# Patient Record
Sex: FEMALE | Race: WHITE | NOT HISPANIC OR LATINO
[De-identification: names, ages, dates, MRNs, and addresses within clinical notes are randomized per-mention and may not be internally consistent; named-entity substitution may affect disease eponyms.]

---

## 2017-01-09 ENCOUNTER — APPOINTMENT (OUTPATIENT)
Dept: OBGYN | Facility: CLINIC | Age: 22
End: 2017-01-09

## 2017-01-09 VITALS
SYSTOLIC BLOOD PRESSURE: 120 MMHG | BODY MASS INDEX: 27.49 KG/M2 | DIASTOLIC BLOOD PRESSURE: 80 MMHG | HEIGHT: 63 IN | WEIGHT: 155.13 LBS

## 2017-01-09 RX ORDER — ZOLPIDEM TARTRATE 10 MG/1
10 TABLET ORAL
Qty: 12 | Refills: 0 | Status: ACTIVE | COMMUNITY
Start: 2017-01-09 | End: 1900-01-01

## 2017-01-11 LAB
GP B STREP DNA SPEC QL NAA+PROBE: NORMAL
GP B STREP DNA SPEC QL NAA+PROBE: NOT DETECTED
SOURCE GBS: NORMAL

## 2017-01-18 ENCOUNTER — APPOINTMENT (OUTPATIENT)
Dept: OBGYN | Facility: CLINIC | Age: 22
End: 2017-01-18

## 2017-01-18 VITALS
HEIGHT: 63 IN | BODY MASS INDEX: 27.82 KG/M2 | DIASTOLIC BLOOD PRESSURE: 60 MMHG | SYSTOLIC BLOOD PRESSURE: 100 MMHG | WEIGHT: 157 LBS

## 2017-01-25 ENCOUNTER — APPOINTMENT (OUTPATIENT)
Dept: OBGYN | Facility: CLINIC | Age: 22
End: 2017-01-25

## 2017-01-25 VITALS
SYSTOLIC BLOOD PRESSURE: 104 MMHG | DIASTOLIC BLOOD PRESSURE: 70 MMHG | BODY MASS INDEX: 27.82 KG/M2 | WEIGHT: 157 LBS | HEIGHT: 63 IN

## 2017-01-31 ENCOUNTER — APPOINTMENT (OUTPATIENT)
Dept: OBGYN | Facility: CLINIC | Age: 22
End: 2017-01-31

## 2017-01-31 VITALS — DIASTOLIC BLOOD PRESSURE: 70 MMHG | SYSTOLIC BLOOD PRESSURE: 120 MMHG | WEIGHT: 158.13 LBS

## 2017-02-04 ENCOUNTER — OUTPATIENT (OUTPATIENT)
Dept: OUTPATIENT SERVICES | Facility: HOSPITAL | Age: 22
LOS: 1 days | End: 2017-02-04
Payer: COMMERCIAL

## 2017-02-04 DIAGNOSIS — Z3A.00 WEEKS OF GESTATION OF PREGNANCY NOT SPECIFIED: ICD-10-CM

## 2017-02-04 DIAGNOSIS — O26.899 OTHER SPECIFIED PREGNANCY RELATED CONDITIONS, UNSPECIFIED TRIMESTER: ICD-10-CM

## 2017-02-04 PROCEDURE — 99214 OFFICE O/P EST MOD 30 MIN: CPT

## 2017-02-08 ENCOUNTER — APPOINTMENT (OUTPATIENT)
Dept: OBGYN | Facility: CLINIC | Age: 22
End: 2017-02-08

## 2017-02-08 VITALS
BODY MASS INDEX: 28.42 KG/M2 | DIASTOLIC BLOOD PRESSURE: 80 MMHG | WEIGHT: 160.38 LBS | HEIGHT: 63 IN | SYSTOLIC BLOOD PRESSURE: 120 MMHG

## 2017-02-13 ENCOUNTER — APPOINTMENT (OUTPATIENT)
Dept: OBGYN | Facility: CLINIC | Age: 22
End: 2017-02-13

## 2017-02-13 VITALS
DIASTOLIC BLOOD PRESSURE: 70 MMHG | HEIGHT: 63 IN | WEIGHT: 160.25 LBS | SYSTOLIC BLOOD PRESSURE: 120 MMHG | BODY MASS INDEX: 28.39 KG/M2

## 2017-02-15 ENCOUNTER — APPOINTMENT (OUTPATIENT)
Dept: OBGYN | Facility: CLINIC | Age: 22
End: 2017-02-15

## 2017-02-15 ENCOUNTER — INPATIENT (INPATIENT)
Facility: HOSPITAL | Age: 22
LOS: 2 days | Discharge: HOME | End: 2017-02-18
Attending: OBSTETRICS & GYNECOLOGY | Admitting: OBSTETRICS & GYNECOLOGY
Payer: COMMERCIAL

## 2017-02-15 VITALS
BODY MASS INDEX: 28.39 KG/M2 | WEIGHT: 160.25 LBS | HEIGHT: 63 IN | DIASTOLIC BLOOD PRESSURE: 80 MMHG | SYSTOLIC BLOOD PRESSURE: 120 MMHG

## 2017-02-15 VITALS — WEIGHT: 160.06 LBS | HEIGHT: 63 IN

## 2017-02-15 LAB
BASOPHILS NFR BLD AUTO: 0.3 % — SIGNIFICANT CHANGE UP (ref 0–2)
EOSINOPHIL NFR BLD AUTO: 0.3 % — SIGNIFICANT CHANGE UP (ref 0–6)
HCT VFR BLD CALC: 35 % — SIGNIFICANT CHANGE UP (ref 34.5–45)
HGB BLD-MCNC: 12 G/DL — SIGNIFICANT CHANGE UP (ref 11.5–15.5)
LYMPHOCYTES # BLD AUTO: 16.9 % — SIGNIFICANT CHANGE UP (ref 13–44)
MCHC RBC-ENTMCNC: 30 PG — SIGNIFICANT CHANGE UP (ref 27–34)
MCHC RBC-ENTMCNC: 34.3 G/DL — SIGNIFICANT CHANGE UP (ref 32–36)
MCV RBC AUTO: 87.5 FL — SIGNIFICANT CHANGE UP (ref 80–100)
MONOCYTES NFR BLD AUTO: 8 % — SIGNIFICANT CHANGE UP (ref 2–14)
NEUTROPHILS NFR BLD AUTO: 74.5 % — SIGNIFICANT CHANGE UP (ref 43–77)
PLATELET # BLD AUTO: 304 K/UL — SIGNIFICANT CHANGE UP (ref 150–400)
RBC # BLD: 4 M/UL — SIGNIFICANT CHANGE UP (ref 3.8–5.2)
RBC # FLD: 11.7 % — SIGNIFICANT CHANGE UP (ref 10.3–16.9)
WBC # BLD: 11.9 K/UL — HIGH (ref 3.8–10.5)
WBC # FLD AUTO: 11.9 K/UL — HIGH (ref 3.8–10.5)

## 2017-02-15 RX ORDER — OXYTOCIN 10 UNIT/ML
333.33 VIAL (ML) INJECTION
Qty: 20 | Refills: 0 | Status: DISCONTINUED | OUTPATIENT
Start: 2017-02-15 | End: 2017-02-16

## 2017-02-15 RX ORDER — CITRIC ACID/SODIUM CITRATE 300-500 MG
15 SOLUTION, ORAL ORAL EVERY 4 HOURS
Qty: 0 | Refills: 0 | Status: DISCONTINUED | OUTPATIENT
Start: 2017-02-15 | End: 2017-02-16

## 2017-02-15 RX ORDER — SODIUM CHLORIDE 9 MG/ML
1000 INJECTION, SOLUTION INTRAVENOUS
Qty: 0 | Refills: 0 | Status: DISCONTINUED | OUTPATIENT
Start: 2017-02-15 | End: 2017-02-16

## 2017-02-15 RX ORDER — SODIUM CHLORIDE 9 MG/ML
1000 INJECTION, SOLUTION INTRAVENOUS ONCE
Qty: 0 | Refills: 0 | Status: COMPLETED | OUTPATIENT
Start: 2017-02-15 | End: 2017-02-15

## 2017-02-15 RX ADMIN — SODIUM CHLORIDE 2000 MILLILITER(S): 9 INJECTION, SOLUTION INTRAVENOUS at 22:59

## 2017-02-16 LAB
BLD GP AB SCN SERPL QL: NEGATIVE — SIGNIFICANT CHANGE UP
BLD GP AB SCN SERPL QL: NEGATIVE — SIGNIFICANT CHANGE UP
RH IG SCN BLD-IMP: POSITIVE — SIGNIFICANT CHANGE UP
RH IG SCN BLD-IMP: POSITIVE — SIGNIFICANT CHANGE UP
T PALLIDUM AB TITR SER: NEGATIVE — SIGNIFICANT CHANGE UP

## 2017-02-16 PROCEDURE — 59400 OBSTETRICAL CARE: CPT

## 2017-02-16 RX ORDER — HYDROCORTISONE 1 %
1 OINTMENT (GRAM) TOPICAL EVERY 4 HOURS
Qty: 0 | Refills: 0 | Status: DISCONTINUED | OUTPATIENT
Start: 2017-02-16 | End: 2017-02-18

## 2017-02-16 RX ORDER — SIMETHICONE 80 MG/1
80 TABLET, CHEWABLE ORAL EVERY 6 HOURS
Qty: 0 | Refills: 0 | Status: DISCONTINUED | OUTPATIENT
Start: 2017-02-16 | End: 2017-02-18

## 2017-02-16 RX ORDER — LANOLIN
1 OINTMENT (GRAM) TOPICAL EVERY 6 HOURS
Qty: 0 | Refills: 0 | Status: DISCONTINUED | OUTPATIENT
Start: 2017-02-16 | End: 2017-02-18

## 2017-02-16 RX ORDER — TETANUS TOXOID, REDUCED DIPHTHERIA TOXOID AND ACELLULAR PERTUSSIS VACCINE, ADSORBED 5; 2.5; 8; 8; 2.5 [IU]/.5ML; [IU]/.5ML; UG/.5ML; UG/.5ML; UG/.5ML
0.5 SUSPENSION INTRAMUSCULAR ONCE
Qty: 0 | Refills: 0 | Status: DISCONTINUED | OUTPATIENT
Start: 2017-02-16 | End: 2017-02-18

## 2017-02-16 RX ORDER — PRAMOXINE HYDROCHLORIDE 150 MG/15G
1 AEROSOL, FOAM RECTAL EVERY 4 HOURS
Qty: 0 | Refills: 0 | Status: DISCONTINUED | OUTPATIENT
Start: 2017-02-16 | End: 2017-02-18

## 2017-02-16 RX ORDER — AER TRAVELER 0.5 G/1
1 SOLUTION RECTAL; TOPICAL EVERY 4 HOURS
Qty: 0 | Refills: 0 | Status: DISCONTINUED | OUTPATIENT
Start: 2017-02-16 | End: 2017-02-18

## 2017-02-16 RX ORDER — ACETAMINOPHEN 500 MG
650 TABLET ORAL EVERY 6 HOURS
Qty: 0 | Refills: 0 | Status: DISCONTINUED | OUTPATIENT
Start: 2017-02-16 | End: 2017-02-18

## 2017-02-16 RX ORDER — INFLUENZA VIRUS VACCINE 15; 15; 15; 15 UG/.5ML; UG/.5ML; UG/.5ML; UG/.5ML
0.5 SUSPENSION INTRAMUSCULAR ONCE
Qty: 0 | Refills: 0 | Status: DISCONTINUED | OUTPATIENT
Start: 2017-02-16 | End: 2017-02-16

## 2017-02-16 RX ORDER — DOCUSATE SODIUM 100 MG
100 CAPSULE ORAL
Qty: 0 | Refills: 0 | Status: DISCONTINUED | OUTPATIENT
Start: 2017-02-16 | End: 2017-02-18

## 2017-02-16 RX ORDER — SODIUM CHLORIDE 9 MG/ML
3 INJECTION INTRAMUSCULAR; INTRAVENOUS; SUBCUTANEOUS EVERY 8 HOURS
Qty: 0 | Refills: 0 | Status: DISCONTINUED | OUTPATIENT
Start: 2017-02-16 | End: 2017-02-18

## 2017-02-16 RX ORDER — SODIUM CHLORIDE 9 MG/ML
500 INJECTION, SOLUTION INTRAVENOUS ONCE
Qty: 0 | Refills: 0 | Status: COMPLETED | OUTPATIENT
Start: 2017-02-16 | End: 2017-02-16

## 2017-02-16 RX ORDER — GLYCERIN ADULT
1 SUPPOSITORY, RECTAL RECTAL AT BEDTIME
Qty: 0 | Refills: 0 | Status: DISCONTINUED | OUTPATIENT
Start: 2017-02-16 | End: 2017-02-18

## 2017-02-16 RX ORDER — IBUPROFEN 200 MG
600 TABLET ORAL EVERY 6 HOURS
Qty: 0 | Refills: 0 | Status: DISCONTINUED | OUTPATIENT
Start: 2017-02-16 | End: 2017-02-18

## 2017-02-16 RX ORDER — DIPHENHYDRAMINE HCL 50 MG
25 CAPSULE ORAL EVERY 6 HOURS
Qty: 0 | Refills: 0 | Status: DISCONTINUED | OUTPATIENT
Start: 2017-02-16 | End: 2017-02-18

## 2017-02-16 RX ORDER — BENZOCAINE 10 %
1 GEL (GRAM) MUCOUS MEMBRANE EVERY 6 HOURS
Qty: 0 | Refills: 0 | Status: DISCONTINUED | OUTPATIENT
Start: 2017-02-16 | End: 2017-02-18

## 2017-02-16 RX ORDER — OXYTOCIN 10 UNIT/ML
41.67 VIAL (ML) INJECTION
Qty: 20 | Refills: 0 | Status: DISCONTINUED | OUTPATIENT
Start: 2017-02-16 | End: 2017-02-18

## 2017-02-16 RX ORDER — DIBUCAINE 1 %
1 OINTMENT (GRAM) RECTAL EVERY 4 HOURS
Qty: 0 | Refills: 0 | Status: DISCONTINUED | OUTPATIENT
Start: 2017-02-16 | End: 2017-02-18

## 2017-02-16 RX ORDER — MAGNESIUM HYDROXIDE 400 MG/1
30 TABLET, CHEWABLE ORAL
Qty: 0 | Refills: 0 | Status: DISCONTINUED | OUTPATIENT
Start: 2017-02-16 | End: 2017-02-18

## 2017-02-16 RX ADMIN — Medication 650 MILLIGRAM(S): at 16:40

## 2017-02-16 RX ADMIN — Medication 600 MILLIGRAM(S): at 12:30

## 2017-02-16 RX ADMIN — Medication 600 MILLIGRAM(S): at 11:19

## 2017-02-16 RX ADMIN — SODIUM CHLORIDE 3 MILLILITER(S): 9 INJECTION INTRAMUSCULAR; INTRAVENOUS; SUBCUTANEOUS at 22:27

## 2017-02-16 RX ADMIN — Medication 650 MILLIGRAM(S): at 22:20

## 2017-02-16 RX ADMIN — Medication 650 MILLIGRAM(S): at 21:39

## 2017-02-16 RX ADMIN — Medication 125 MILLIUNIT(S)/MIN: at 09:45

## 2017-02-16 RX ADMIN — SODIUM CHLORIDE 3 MILLILITER(S): 9 INJECTION INTRAMUSCULAR; INTRAVENOUS; SUBCUTANEOUS at 14:10

## 2017-02-16 RX ADMIN — SODIUM CHLORIDE 1500 MILLILITER(S): 9 INJECTION, SOLUTION INTRAVENOUS at 01:54

## 2017-02-16 RX ADMIN — Medication 650 MILLIGRAM(S): at 15:42

## 2017-02-17 RX ADMIN — Medication 1 APPLICATION(S): at 09:03

## 2017-02-17 RX ADMIN — Medication 1 SPRAY(S): at 12:13

## 2017-02-17 RX ADMIN — AER TRAVELER 1 APPLICATION(S): 0.5 SOLUTION RECTAL; TOPICAL at 12:14

## 2017-02-17 RX ADMIN — SODIUM CHLORIDE 3 MILLILITER(S): 9 INJECTION INTRAMUSCULAR; INTRAVENOUS; SUBCUTANEOUS at 06:28

## 2017-02-17 RX ADMIN — Medication 100 MILLIGRAM(S): at 12:14

## 2017-02-17 RX ADMIN — SODIUM CHLORIDE 3 MILLILITER(S): 9 INJECTION INTRAMUSCULAR; INTRAVENOUS; SUBCUTANEOUS at 22:04

## 2017-02-17 RX ADMIN — Medication 600 MILLIGRAM(S): at 19:30

## 2017-02-17 RX ADMIN — Medication 600 MILLIGRAM(S): at 13:00

## 2017-02-17 RX ADMIN — Medication 600 MILLIGRAM(S): at 18:40

## 2017-02-17 RX ADMIN — Medication 600 MILLIGRAM(S): at 04:58

## 2017-02-17 RX ADMIN — Medication 600 MILLIGRAM(S): at 04:19

## 2017-02-17 RX ADMIN — Medication 600 MILLIGRAM(S): at 12:14

## 2017-02-18 ENCOUNTER — TRANSCRIPTION ENCOUNTER (OUTPATIENT)
Age: 22
End: 2017-02-18

## 2017-02-18 VITALS
SYSTOLIC BLOOD PRESSURE: 100 MMHG | OXYGEN SATURATION: 100 % | TEMPERATURE: 98 F | RESPIRATION RATE: 18 BRPM | HEART RATE: 72 BPM | DIASTOLIC BLOOD PRESSURE: 54 MMHG

## 2017-02-18 PROCEDURE — 86900 BLOOD TYPING SEROLOGIC ABO: CPT

## 2017-02-18 PROCEDURE — 86901 BLOOD TYPING SEROLOGIC RH(D): CPT

## 2017-02-18 PROCEDURE — 99214 OFFICE O/P EST MOD 30 MIN: CPT

## 2017-02-18 PROCEDURE — 36415 COLL VENOUS BLD VENIPUNCTURE: CPT

## 2017-02-18 PROCEDURE — 86780 TREPONEMA PALLIDUM: CPT

## 2017-02-18 PROCEDURE — 85025 COMPLETE CBC W/AUTO DIFF WBC: CPT

## 2017-02-18 PROCEDURE — 86850 RBC ANTIBODY SCREEN: CPT

## 2017-02-18 RX ORDER — IBUPROFEN 200 MG
1 TABLET ORAL
Qty: 0 | Refills: 0 | DISCHARGE
Start: 2017-02-18

## 2017-02-18 RX ADMIN — Medication 600 MILLIGRAM(S): at 00:07

## 2017-02-18 RX ADMIN — Medication 600 MILLIGRAM(S): at 06:54

## 2017-02-18 RX ADMIN — Medication 1 TABLET(S): at 09:54

## 2017-02-18 RX ADMIN — Medication 600 MILLIGRAM(S): at 01:05

## 2017-02-18 RX ADMIN — Medication 100 MILLIGRAM(S): at 09:55

## 2017-02-18 RX ADMIN — SODIUM CHLORIDE 3 MILLILITER(S): 9 INJECTION INTRAMUSCULAR; INTRAVENOUS; SUBCUTANEOUS at 06:07

## 2017-02-18 RX ADMIN — Medication 100 MILLIGRAM(S): at 00:08

## 2017-02-18 RX ADMIN — Medication 600 MILLIGRAM(S): at 06:07

## 2017-02-18 NOTE — PROGRESS NOTE ADULT - SUBJECTIVE AND OBJECTIVE BOX
Patient evaluated at bedside.   She reports pain is well controlled   She denies headache, dizziness, chest pain, palpitations, shortness of breathe, nausea, vomiting, heavy vaginal bleeding or perineal discomfort.  She has been ambulating without assistance, voiding spontaneously, and is breastfeeding.    Physical Exam:  T(C): 36.1, Max: 36.1 (02-17 @ 21:42)  HR: 76 (76 - 76)  BP: 93/52 (93/52 - 93/52)  RR: 18 (18 - 18)  SpO2: 99% (99% - 99%)  Wt(kg): --    GA: NAD, A+0 x 3  CV: RRR  Pulm: CTAB  Breasts: soft, nontender, no palpable masses  Abd: + BS, soft, nontender, nondistended, no rebound or guarding, uterus firm at midline, 2  fb below umbilicus  : lochia WNL  Extremities: no swelling or calf tenderness, reflexes +2 bilaterally

## 2017-02-18 NOTE — DISCHARGE NOTE OB - PATIENT PORTAL LINK FT
“You can access the FollowHealth Patient Portal, offered by [facility], by registering with the following website: [email or web address]”

## 2017-02-18 NOTE — DISCHARGE NOTE OB - CARE PROVIDERS DIRECT ADDRESSES
[email or web address]

## 2017-02-18 NOTE — PROGRESS NOTE ADULT - ATTENDING COMMENTS
22yo now para 1 PPD #2 s/p  of healthy male infant, doing well, cleared for discharge home today, after the Sabbath, with follow up with Dr. Hunt in 6 weeks.

## 2017-02-18 NOTE — DISCHARGE NOTE OB - PLAN OF CARE
discharge home back to activities of daily living Patient to follow up with physician in 6 weeks. Nothing per vagina (no tampons, tub baths, intercourse). Patient to notify physician for fever >101, heavy vaginal bleeding, extreme abdominal pain

## 2017-02-18 NOTE — PROGRESS NOTE ADULT - ASSESSMENT
A/P yo 21y s/p , PPD # 2 ,stable  1. Pain: well controlled on OPM  2. GI: Regular diet  3. : s.p snow  4. DVT prophylaxis: SCDs, ambulate  5. Dispo: PPD 2, unless otherwise specified

## 2017-02-18 NOTE — DISCHARGE NOTE OB - CARE PLAN
Principal Discharge DX:	Postpartum state  Goal:	discharge home back to activities of daily living  Instructions for follow-up, activity and diet:	Patient to follow up with physician in 6 weeks. Nothing per vagina (no tampons, tub baths, intercourse). Patient to notify physician for fever >101, heavy vaginal bleeding, extreme abdominal pain

## 2017-02-21 ENCOUNTER — APPOINTMENT (OUTPATIENT)
Dept: OBGYN | Facility: CLINIC | Age: 22
End: 2017-02-21

## 2017-02-21 DIAGNOSIS — Z3A.40 40 WEEKS GESTATION OF PREGNANCY: ICD-10-CM

## 2017-02-21 DIAGNOSIS — Z34.03 ENCOUNTER FOR SUPERVISION OF NORMAL FIRST PREGNANCY, THIRD TRIMESTER: ICD-10-CM

## 2017-03-23 DIAGNOSIS — Z34.01 ENCOUNTER FOR SUPERVISION OF NORMAL FIRST PREGNANCY, FIRST TRIMESTER: ICD-10-CM

## 2017-03-23 RX ORDER — ASCORBIC ACID, CHOLECALCIFEROL, .ALPHA.-TOCOPHEROL ACETATE, DL-, PYRIDOXINE HYDROCHLORIDE, FOLIC ACID, CYANOCOBALAMIN, BIOTIN, CALCIUM CARBONATE, FERROUS ASPARTO GLYCINATE, IRON, POTASSIUM IODIDE, MAGNESIUM OXIDE, DOCONEXENT AND LOWBUSH BLUEBERRY 60; 1000; 10; 26; 400; 13; 280; 80; 9; 9; 150; 25; 350; 25; 600 MG/1; [IU]/1; [IU]/1; MG/1; UG/1; UG/1; UG/1; MG/1; MG/1; MG/1; UG/1; MG/1; MG/1; MG/1; UG/1
18-0.6-0.4-35 CAPSULE, GELATIN COATED ORAL
Qty: 30 | Refills: 0 | Status: DISCONTINUED | COMMUNITY
Start: 2016-09-14

## 2017-03-23 RX ORDER — VITAMIN A ACETATE, .BETA.-CAROTENE, ASCORBIC ACID, CHOLECALCIFEROL, .ALPHA.-TOCOPHEROL ACETATE, DL-, THIAMINE MONONITRATE, RIBOFLAVIN, NIACINAMIDE, PYRIDOXINE HYDROCHLORIDE, FOLIC ACID, CYANOCOBALAMIN, CALCIUM CARBONATE, FERROUS FUMARATE, ZINC OXIDE, CUPRIC OXIDE 3080; 920; 120; 400; 22; 1.84; 3; 20; 10; 1; 12; 200; 27; 25; 2 [IU]/1; [IU]/1; MG/1; [IU]/1; MG/1; MG/1; MG/1; MG/1; MG/1; MG/1; UG/1; MG/1; MG/1; MG/1; MG/1
27-1 TABLET, FILM COATED ORAL
Qty: 30 | Refills: 0 | Status: DISCONTINUED | COMMUNITY
Start: 2016-08-24

## 2017-03-29 ENCOUNTER — APPOINTMENT (OUTPATIENT)
Dept: OBGYN | Facility: CLINIC | Age: 22
End: 2017-03-29

## 2017-03-29 VITALS
HEIGHT: 63 IN | DIASTOLIC BLOOD PRESSURE: 90 MMHG | SYSTOLIC BLOOD PRESSURE: 140 MMHG | WEIGHT: 144.25 LBS | BODY MASS INDEX: 25.56 KG/M2

## 2017-04-03 LAB — CYTOLOGY CVX/VAG DOC THIN PREP: NORMAL

## 2017-04-14 RX ORDER — LEVONORGESTREL AND ETHINYL ESTRADIOL AND ETHINYL ESTRADIOL 150-30(84)
0.15-0.03 &0.01 KIT ORAL DAILY
Qty: 90 | Refills: 2 | Status: ACTIVE | COMMUNITY
Start: 2017-04-14 | End: 1900-01-01

## 2018-03-06 ENCOUNTER — RX RENEWAL (OUTPATIENT)
Age: 23
End: 2018-03-06

## 2018-06-26 ENCOUNTER — APPOINTMENT (OUTPATIENT)
Dept: OBGYN | Facility: CLINIC | Age: 23
End: 2018-06-26
Payer: COMMERCIAL

## 2018-06-26 VITALS
BODY MASS INDEX: 23.77 KG/M2 | HEIGHT: 63 IN | WEIGHT: 134.13 LBS | DIASTOLIC BLOOD PRESSURE: 70 MMHG | SYSTOLIC BLOOD PRESSURE: 100 MMHG

## 2018-06-26 PROCEDURE — 99395 PREV VISIT EST AGE 18-39: CPT

## 2018-07-02 LAB — PAP TEST: NORMAL

## 2018-11-08 ENCOUNTER — RX RENEWAL (OUTPATIENT)
Age: 23
End: 2018-11-08

## 2020-01-20 ENCOUNTER — RX RENEWAL (OUTPATIENT)
Age: 25
End: 2020-01-20

## 2020-07-02 ENCOUNTER — APPOINTMENT (OUTPATIENT)
Dept: OBGYN | Facility: CLINIC | Age: 25
End: 2020-07-02
Payer: COMMERCIAL

## 2020-07-02 VITALS
BODY MASS INDEX: 23.21 KG/M2 | WEIGHT: 131 LBS | DIASTOLIC BLOOD PRESSURE: 60 MMHG | HEIGHT: 63 IN | SYSTOLIC BLOOD PRESSURE: 100 MMHG

## 2020-07-02 DIAGNOSIS — Z01.419 ENCOUNTER FOR GYNECOLOGICAL EXAMINATION (GENERAL) (ROUTINE) W/OUT ABNORMAL FINDINGS: ICD-10-CM

## 2020-07-02 PROCEDURE — 99395 PREV VISIT EST AGE 18-39: CPT

## 2020-07-02 NOTE — HISTORY OF PRESENT ILLNESS
[1 Year Ago] : 1 year ago [Good] : being in good health [Regular Exercise] : regular exercise [Healthy Diet] : a healthy diet [Weight Concerns] : no concerns with her weight [Menstrual Problems] : reports normal menses [Up to Date] : up to date with ~his/her~ immunizations [Spotting Between  Menses] : no spotting between menses [On BCP at conception] : the patient was not on BCP at conception [Contraception] : does not use contraception

## 2020-07-02 NOTE — PHYSICAL EXAM
[Awake] : awake [Mass] : no breast mass [Acute Distress] : no acute distress [Alert] : alert [Nipple Discharge] : no nipple discharge [Tender] : non tender [Axillary LAD] : no axillary lymphadenopathy [Soft] : soft [Oriented x3] : oriented to person, place, and time [Normal] : cervix [Uterine Adnexae] : were not tender and not enlarged [No Bleeding] : there was no active vaginal bleeding

## 2020-07-02 NOTE — CHIEF COMPLAINT
[FreeTextEntry1] : Annual exam along with lower left quad  pain for two weeks.\par Possible pregnancy.\par off birth control x two months\par Negative pregnancy test.

## 2020-07-06 ENCOUNTER — APPOINTMENT (OUTPATIENT)
Dept: OBGYN | Facility: CLINIC | Age: 25
End: 2020-07-06

## 2020-07-09 LAB — CYTOLOGY CVX/VAG DOC THIN PREP: ABNORMAL

## 2020-08-31 ENCOUNTER — APPOINTMENT (OUTPATIENT)
Dept: OBGYN | Facility: CLINIC | Age: 25
End: 2020-08-31
Payer: COMMERCIAL

## 2020-08-31 VITALS — WEIGHT: 134 LBS | SYSTOLIC BLOOD PRESSURE: 100 MMHG | DIASTOLIC BLOOD PRESSURE: 70 MMHG

## 2020-08-31 PROCEDURE — 36415 COLL VENOUS BLD VENIPUNCTURE: CPT

## 2020-08-31 PROCEDURE — 0500F INITIAL PRENATAL CARE VISIT: CPT

## 2020-08-31 RX ORDER — ASCORBIC ACID, CHOLECALCIFEROL, .ALPHA.-TOCOPHEROL ACETATE, DL-, PYRIDOXINE HYDROCHLORIDE, FOLIC ACID, CYANOCOBALAMIN, BIOTIN, CALCIUM CARBONATE, FERROUS ASPARTO GLYCINATE, IRON, POTASSIUM IODIDE, MAGNESIUM OXIDE, DOCONEXENT AND LOWBUSH BLUEBERRY 60; 1000; 10; 26; 400; 13; 280; 80; 9; 9; 150; 25; 350; 25; 600 MG/1; [IU]/1; [IU]/1; MG/1; UG/1; UG/1; UG/1; MG/1; MG/1; MG/1; UG/1; MG/1; MG/1; MG/1; UG/1
18-0.6-0.4-35 CAPSULE, GELATIN COATED ORAL
Qty: 90 | Refills: 3 | Status: ACTIVE | COMMUNITY
Start: 2020-08-31 | End: 1900-01-01

## 2020-08-31 RX ORDER — ONDANSETRON 8 MG/1
8 TABLET ORAL
Qty: 30 | Refills: 2 | Status: ACTIVE | COMMUNITY
Start: 2020-08-31 | End: 1900-01-01

## 2020-08-31 RX ORDER — NAPROXEN SODIUM 550 MG/1
550 TABLET ORAL
Qty: 60 | Refills: 0 | Status: ACTIVE | COMMUNITY
Start: 2020-06-15

## 2020-08-31 RX ORDER — CHLORDIAZEPOXIDE HYDROCHLORIDE AND CLIDINIUM BROMIDE 5; 2.5 MG/1; MG/1
5-2.5 CAPSULE, GELATIN COATED ORAL
Qty: 30 | Refills: 0 | Status: ACTIVE | COMMUNITY
Start: 2020-06-22

## 2020-08-31 RX ORDER — AMOXICILLIN AND CLAVULANATE POTASSIUM 875; 125 MG/1; MG/1
875-125 TABLET, COATED ORAL
Qty: 20 | Refills: 0 | Status: ACTIVE | COMMUNITY
Start: 2020-03-15

## 2020-09-01 LAB
ABO + RH PNL BLD: NORMAL
BASOPHILS # BLD AUTO: 0.04 K/UL
BASOPHILS NFR BLD AUTO: 0.5 %
BLD GP AB SCN SERPL QL: NORMAL
CMV IGG SERPL QL: <0.2 U/ML
CMV IGG SERPL-IMP: NEGATIVE
CMV IGM SERPL QL: <8 AU/ML
CMV IGM SERPL QL: NEGATIVE
EOSINOPHIL # BLD AUTO: 0.02 K/UL
EOSINOPHIL NFR BLD AUTO: 0.2 %
HBV SURFACE AG SER QL: NONREACTIVE
HCT VFR BLD CALC: 39.6 %
HCV AB SER QL: NONREACTIVE
HCV S/CO RATIO: 0.11 S/CO
HGB BLD-MCNC: 13.2 G/DL
HIV1+2 AB SPEC QL IA.RAPID: NONREACTIVE
HSV 1+2 IGG SER IA-IMP: NEGATIVE
HSV 1+2 IGG SER IA-IMP: NEGATIVE
HSV1 IGG SER QL: 0.04 INDEX
HSV2 IGG SER QL: 0.11 INDEX
IMM GRANULOCYTES NFR BLD AUTO: 0.4 %
LYMPHOCYTES # BLD AUTO: 1.66 K/UL
LYMPHOCYTES NFR BLD AUTO: 19.6 %
MAN DIFF?: NORMAL
MCHC RBC-ENTMCNC: 30.3 PG
MCHC RBC-ENTMCNC: 33.3 GM/DL
MCV RBC AUTO: 90.8 FL
MEV IGG FLD QL IA: 65.7 AU/ML
MEV IGG+IGM SER-IMP: POSITIVE
MONOCYTES # BLD AUTO: 0.56 K/UL
MONOCYTES NFR BLD AUTO: 6.6 %
NEUTROPHILS # BLD AUTO: 6.16 K/UL
NEUTROPHILS NFR BLD AUTO: 72.7 %
PLATELET # BLD AUTO: 316 K/UL
RBC # BLD: 4.36 M/UL
RBC # FLD: 12 %
RUBV IGG FLD-ACNC: 8.2 INDEX
RUBV IGG SER-IMP: POSITIVE
T GONDII AB SER-IMP: NEGATIVE
T GONDII AB SER-IMP: NEGATIVE
T GONDII IGG SER QL: <3 IU/ML
T GONDII IGM SER QL: <3 AU/ML
T PALLIDUM AB SER QL IA: NEGATIVE
TSH SERPL-ACNC: 1.3 UIU/ML
VZV AB TITR SER: POSITIVE
VZV IGG SER IF-ACNC: 461.9 INDEX
WBC # FLD AUTO: 8.47 K/UL

## 2020-09-02 LAB
C TRACH RRNA SPEC QL NAA+PROBE: NOT DETECTED
N GONORRHOEA RRNA SPEC QL NAA+PROBE: NOT DETECTED
SOURCE AMPLIFICATION: NORMAL

## 2020-09-03 LAB
B19V IGG SER QL IA: 8.1 INDEX
B19V IGG+IGM SER-IMP: NORMAL
B19V IGG+IGM SER-IMP: POSITIVE
B19V IGM FLD-ACNC: 0.1
B19V IGM SER-ACNC: NEGATIVE
BACTERIA UR CULT: NORMAL
HGB A MFR BLD: 97 %
HGB A2 MFR BLD: 3 %
HGB FRACT BLD-IMP: NORMAL
HSV1 IGM SER QL: NORMAL TITER
HSV2 AB FLD-ACNC: NORMAL TITER
MEV IGM SER QL: NEGATIVE
VZV IGM SER IF-ACNC: <0.91 INDEX

## 2020-09-14 ENCOUNTER — TRANSCRIPTION ENCOUNTER (OUTPATIENT)
Age: 25
End: 2020-09-14

## 2020-09-24 ENCOUNTER — APPOINTMENT (OUTPATIENT)
Dept: OBGYN | Facility: CLINIC | Age: 25
End: 2020-09-24
Payer: COMMERCIAL

## 2020-09-24 VITALS — DIASTOLIC BLOOD PRESSURE: 60 MMHG | WEIGHT: 134 LBS | SYSTOLIC BLOOD PRESSURE: 90 MMHG

## 2020-09-24 PROCEDURE — 0502F SUBSEQUENT PRENATAL CARE: CPT

## 2020-10-07 ENCOUNTER — APPOINTMENT (OUTPATIENT)
Dept: ANTEPARTUM | Facility: CLINIC | Age: 25
End: 2020-10-07
Payer: COMMERCIAL

## 2020-10-07 PROCEDURE — 76813 OB US NUCHAL MEAS 1 GEST: CPT

## 2020-10-07 PROCEDURE — 76801 OB US < 14 WKS SINGLE FETUS: CPT

## 2020-11-03 ENCOUNTER — APPOINTMENT (OUTPATIENT)
Dept: ANTEPARTUM | Facility: CLINIC | Age: 25
End: 2020-11-03
Payer: COMMERCIAL

## 2020-11-03 PROCEDURE — 76805 OB US >/= 14 WKS SNGL FETUS: CPT

## 2020-11-03 PROCEDURE — 99072 ADDL SUPL MATRL&STAF TM PHE: CPT

## 2020-12-07 ENCOUNTER — APPOINTMENT (OUTPATIENT)
Dept: ANTEPARTUM | Facility: CLINIC | Age: 25
End: 2020-12-07

## 2020-12-10 ENCOUNTER — APPOINTMENT (OUTPATIENT)
Dept: ANTEPARTUM | Facility: CLINIC | Age: 25
End: 2020-12-10
Payer: COMMERCIAL

## 2020-12-10 ENCOUNTER — APPOINTMENT (OUTPATIENT)
Dept: OBGYN | Facility: CLINIC | Age: 25
End: 2020-12-10
Payer: COMMERCIAL

## 2020-12-10 VITALS
WEIGHT: 150.5 LBS | BODY MASS INDEX: 26.67 KG/M2 | HEIGHT: 63 IN | SYSTOLIC BLOOD PRESSURE: 100 MMHG | DIASTOLIC BLOOD PRESSURE: 60 MMHG

## 2020-12-10 PROCEDURE — 99072 ADDL SUPL MATRL&STAF TM PHE: CPT

## 2020-12-10 PROCEDURE — 76816 OB US FOLLOW-UP PER FETUS: CPT

## 2020-12-10 PROCEDURE — 0502F SUBSEQUENT PRENATAL CARE: CPT

## 2020-12-16 ENCOUNTER — APPOINTMENT (OUTPATIENT)
Dept: ANTEPARTUM | Facility: CLINIC | Age: 25
End: 2020-12-16

## 2020-12-23 PROBLEM — Z01.419 ENCOUNTER FOR ANNUAL ROUTINE GYNECOLOGICAL EXAMINATION: Status: RESOLVED | Noted: 2018-06-26 | Resolved: 2020-12-23

## 2020-12-31 ENCOUNTER — APPOINTMENT (OUTPATIENT)
Dept: ANTEPARTUM | Facility: CLINIC | Age: 25
End: 2020-12-31
Payer: COMMERCIAL

## 2020-12-31 PROCEDURE — 99072 ADDL SUPL MATRL&STAF TM PHE: CPT

## 2020-12-31 PROCEDURE — 76816 OB US FOLLOW-UP PER FETUS: CPT

## 2021-01-20 ENCOUNTER — NON-APPOINTMENT (OUTPATIENT)
Age: 26
End: 2021-01-20

## 2021-01-20 ENCOUNTER — APPOINTMENT (OUTPATIENT)
Dept: OBGYN | Facility: CLINIC | Age: 26
End: 2021-01-20
Payer: COMMERCIAL

## 2021-01-20 VITALS
SYSTOLIC BLOOD PRESSURE: 110 MMHG | HEIGHT: 63 IN | BODY MASS INDEX: 27.64 KG/M2 | WEIGHT: 156 LBS | DIASTOLIC BLOOD PRESSURE: 70 MMHG

## 2021-01-20 PROCEDURE — 36415 COLL VENOUS BLD VENIPUNCTURE: CPT

## 2021-01-20 PROCEDURE — 0502F SUBSEQUENT PRENATAL CARE: CPT

## 2021-01-21 LAB
BASOPHILS # BLD AUTO: 0.03 K/UL
BASOPHILS NFR BLD AUTO: 0.4 %
EOSINOPHIL # BLD AUTO: 0.04 K/UL
EOSINOPHIL NFR BLD AUTO: 0.5 %
ESTIMATED AVERAGE GLUCOSE: 80 MG/DL
GLUCOSE 1H P 50 G GLC PO SERPL-MCNC: 99 MG/DL
HBA1C MFR BLD HPLC: 4.4 %
HCT VFR BLD CALC: 34.9 %
HGB BLD-MCNC: 11.3 G/DL
IMM GRANULOCYTES NFR BLD AUTO: 0.6 %
LYMPHOCYTES # BLD AUTO: 1.61 K/UL
LYMPHOCYTES NFR BLD AUTO: 19.8 %
MAN DIFF?: NORMAL
MCHC RBC-ENTMCNC: 31.3 PG
MCHC RBC-ENTMCNC: 32.4 GM/DL
MCV RBC AUTO: 96.7 FL
MONOCYTES # BLD AUTO: 0.6 K/UL
MONOCYTES NFR BLD AUTO: 7.4 %
NEUTROPHILS # BLD AUTO: 5.79 K/UL
NEUTROPHILS NFR BLD AUTO: 71.3 %
PLATELET # BLD AUTO: 279 K/UL
RBC # BLD: 3.61 M/UL
RBC # FLD: 12.2 %
T PALLIDUM AB SER QL IA: NEGATIVE
WBC # FLD AUTO: 8.12 K/UL

## 2021-01-26 ENCOUNTER — NON-APPOINTMENT (OUTPATIENT)
Age: 26
End: 2021-01-26

## 2021-01-26 LAB — BACTERIA UR CULT: NORMAL

## 2021-01-30 ENCOUNTER — OUTPATIENT (OUTPATIENT)
Dept: OUTPATIENT SERVICES | Facility: HOSPITAL | Age: 26
LOS: 1 days | End: 2021-01-30
Payer: COMMERCIAL

## 2021-01-30 DIAGNOSIS — Z3A.00 WEEKS OF GESTATION OF PREGNANCY NOT SPECIFIED: ICD-10-CM

## 2021-01-30 DIAGNOSIS — O26.899 OTHER SPECIFIED PREGNANCY RELATED CONDITIONS, UNSPECIFIED TRIMESTER: ICD-10-CM

## 2021-01-30 PROCEDURE — 99214 OFFICE O/P EST MOD 30 MIN: CPT

## 2021-02-23 ENCOUNTER — NON-APPOINTMENT (OUTPATIENT)
Age: 26
End: 2021-02-23

## 2021-02-23 ENCOUNTER — ASOB RESULT (OUTPATIENT)
Age: 26
End: 2021-02-23

## 2021-02-23 ENCOUNTER — APPOINTMENT (OUTPATIENT)
Dept: OBGYN | Facility: CLINIC | Age: 26
End: 2021-02-23
Payer: COMMERCIAL

## 2021-02-23 ENCOUNTER — APPOINTMENT (OUTPATIENT)
Dept: ANTEPARTUM | Facility: CLINIC | Age: 26
End: 2021-02-23
Payer: COMMERCIAL

## 2021-02-23 VITALS
DIASTOLIC BLOOD PRESSURE: 60 MMHG | WEIGHT: 168.63 LBS | BODY MASS INDEX: 29.88 KG/M2 | SYSTOLIC BLOOD PRESSURE: 100 MMHG | HEIGHT: 63 IN

## 2021-02-23 PROCEDURE — 76819 FETAL BIOPHYS PROFIL W/O NST: CPT

## 2021-02-23 PROCEDURE — 0502F SUBSEQUENT PRENATAL CARE: CPT

## 2021-02-23 PROCEDURE — 99072 ADDL SUPL MATRL&STAF TM PHE: CPT

## 2021-03-15 ENCOUNTER — NON-APPOINTMENT (OUTPATIENT)
Age: 26
End: 2021-03-15

## 2021-03-15 ENCOUNTER — APPOINTMENT (OUTPATIENT)
Dept: OBGYN | Facility: CLINIC | Age: 26
End: 2021-03-15
Payer: COMMERCIAL

## 2021-03-15 VITALS — DIASTOLIC BLOOD PRESSURE: 70 MMHG | WEIGHT: 169.63 LBS | SYSTOLIC BLOOD PRESSURE: 120 MMHG

## 2021-03-15 PROCEDURE — 0502F SUBSEQUENT PRENATAL CARE: CPT

## 2021-03-20 LAB — B-HEM STREP SPEC QL CULT: ABNORMAL

## 2021-03-25 ENCOUNTER — NON-APPOINTMENT (OUTPATIENT)
Age: 26
End: 2021-03-25

## 2021-03-25 ENCOUNTER — APPOINTMENT (OUTPATIENT)
Dept: OBGYN | Facility: CLINIC | Age: 26
End: 2021-03-25
Payer: COMMERCIAL

## 2021-03-25 ENCOUNTER — ASOB RESULT (OUTPATIENT)
Age: 26
End: 2021-03-25

## 2021-03-25 ENCOUNTER — APPOINTMENT (OUTPATIENT)
Dept: ANTEPARTUM | Facility: CLINIC | Age: 26
End: 2021-03-25
Payer: COMMERCIAL

## 2021-03-25 VITALS
BODY MASS INDEX: 29.23 KG/M2 | DIASTOLIC BLOOD PRESSURE: 80 MMHG | HEIGHT: 63 IN | WEIGHT: 165 LBS | SYSTOLIC BLOOD PRESSURE: 110 MMHG

## 2021-03-25 PROCEDURE — 0502F SUBSEQUENT PRENATAL CARE: CPT

## 2021-03-25 PROCEDURE — 99072 ADDL SUPL MATRL&STAF TM PHE: CPT

## 2021-03-25 PROCEDURE — 76816 OB US FOLLOW-UP PER FETUS: CPT

## 2021-04-01 ENCOUNTER — NON-APPOINTMENT (OUTPATIENT)
Age: 26
End: 2021-04-01

## 2021-04-01 ENCOUNTER — APPOINTMENT (OUTPATIENT)
Dept: OBGYN | Facility: CLINIC | Age: 26
End: 2021-04-01
Payer: COMMERCIAL

## 2021-04-01 VITALS
SYSTOLIC BLOOD PRESSURE: 100 MMHG | BODY MASS INDEX: 29.23 KG/M2 | HEIGHT: 63 IN | WEIGHT: 165 LBS | DIASTOLIC BLOOD PRESSURE: 60 MMHG

## 2021-04-01 PROCEDURE — 0502F SUBSEQUENT PRENATAL CARE: CPT

## 2021-04-09 ENCOUNTER — NON-APPOINTMENT (OUTPATIENT)
Age: 26
End: 2021-04-09

## 2021-04-09 ENCOUNTER — APPOINTMENT (OUTPATIENT)
Dept: OBGYN | Facility: CLINIC | Age: 26
End: 2021-04-09
Payer: COMMERCIAL

## 2021-04-09 VITALS
HEIGHT: 63 IN | SYSTOLIC BLOOD PRESSURE: 110 MMHG | BODY MASS INDEX: 29.95 KG/M2 | WEIGHT: 169 LBS | DIASTOLIC BLOOD PRESSURE: 80 MMHG

## 2021-04-09 PROCEDURE — 0502F SUBSEQUENT PRENATAL CARE: CPT

## 2021-04-15 ENCOUNTER — NON-APPOINTMENT (OUTPATIENT)
Age: 26
End: 2021-04-15

## 2021-04-15 ENCOUNTER — APPOINTMENT (OUTPATIENT)
Dept: OBGYN | Facility: CLINIC | Age: 26
End: 2021-04-15
Payer: COMMERCIAL

## 2021-04-15 VITALS — WEIGHT: 169 LBS | DIASTOLIC BLOOD PRESSURE: 70 MMHG | SYSTOLIC BLOOD PRESSURE: 100 MMHG

## 2021-04-15 PROCEDURE — 0502F SUBSEQUENT PRENATAL CARE: CPT

## 2021-04-19 ENCOUNTER — INPATIENT (INPATIENT)
Facility: HOSPITAL | Age: 26
LOS: 1 days | Discharge: HOME | End: 2021-04-21
Attending: OBSTETRICS & GYNECOLOGY | Admitting: OBSTETRICS & GYNECOLOGY
Payer: COMMERCIAL

## 2021-04-19 VITALS — WEIGHT: 167.55 LBS | HEIGHT: 63 IN

## 2021-04-19 DIAGNOSIS — O26.899 OTHER SPECIFIED PREGNANCY RELATED CONDITIONS, UNSPECIFIED TRIMESTER: ICD-10-CM

## 2021-04-19 DIAGNOSIS — Z3A.00 WEEKS OF GESTATION OF PREGNANCY NOT SPECIFIED: ICD-10-CM

## 2021-04-19 LAB
BASOPHILS # BLD AUTO: 0.03 K/UL — SIGNIFICANT CHANGE UP (ref 0–0.2)
BASOPHILS NFR BLD AUTO: 0.3 % — SIGNIFICANT CHANGE UP (ref 0–2)
BLD GP AB SCN SERPL QL: NEGATIVE — SIGNIFICANT CHANGE UP
EOSINOPHIL # BLD AUTO: 0.02 K/UL — SIGNIFICANT CHANGE UP (ref 0–0.5)
EOSINOPHIL NFR BLD AUTO: 0.2 % — SIGNIFICANT CHANGE UP (ref 0–6)
HCT VFR BLD CALC: 35.7 % — SIGNIFICANT CHANGE UP (ref 34.5–45)
HGB BLD-MCNC: 11.9 G/DL — SIGNIFICANT CHANGE UP (ref 11.5–15.5)
IMM GRANULOCYTES NFR BLD AUTO: 1.1 % — SIGNIFICANT CHANGE UP (ref 0–1.5)
LYMPHOCYTES # BLD AUTO: 1.69 K/UL — SIGNIFICANT CHANGE UP (ref 1–3.3)
LYMPHOCYTES # BLD AUTO: 18 % — SIGNIFICANT CHANGE UP (ref 13–44)
MCHC RBC-ENTMCNC: 29.5 PG — SIGNIFICANT CHANGE UP (ref 27–34)
MCHC RBC-ENTMCNC: 33.3 GM/DL — SIGNIFICANT CHANGE UP (ref 32–36)
MCV RBC AUTO: 88.6 FL — SIGNIFICANT CHANGE UP (ref 80–100)
MONOCYTES # BLD AUTO: 0.79 K/UL — SIGNIFICANT CHANGE UP (ref 0–0.9)
MONOCYTES NFR BLD AUTO: 8.4 % — SIGNIFICANT CHANGE UP (ref 2–14)
NEUTROPHILS # BLD AUTO: 6.76 K/UL — SIGNIFICANT CHANGE UP (ref 1.8–7.4)
NEUTROPHILS NFR BLD AUTO: 72 % — SIGNIFICANT CHANGE UP (ref 43–77)
NRBC # BLD: 0 /100 WBCS — SIGNIFICANT CHANGE UP (ref 0–0)
PLATELET # BLD AUTO: 259 K/UL — SIGNIFICANT CHANGE UP (ref 150–400)
RBC # BLD: 4.03 M/UL — SIGNIFICANT CHANGE UP (ref 3.8–5.2)
RBC # FLD: 12.5 % — SIGNIFICANT CHANGE UP (ref 10.3–14.5)
RH IG SCN BLD-IMP: POSITIVE — SIGNIFICANT CHANGE UP
RH IG SCN BLD-IMP: POSITIVE — SIGNIFICANT CHANGE UP
WBC # BLD: 9.39 K/UL — SIGNIFICANT CHANGE UP (ref 3.8–10.5)
WBC # FLD AUTO: 9.39 K/UL — SIGNIFICANT CHANGE UP (ref 3.8–10.5)

## 2021-04-19 RX ORDER — FENTANYL/BUPIVACAINE/NS/PF 2MCG/ML-.1
250 PLASTIC BAG, INJECTION (ML) INJECTION
Refills: 0 | Status: DISCONTINUED | OUTPATIENT
Start: 2021-04-19 | End: 2021-04-19

## 2021-04-19 RX ORDER — AMPICILLIN TRIHYDRATE 250 MG
2 CAPSULE ORAL ONCE
Refills: 0 | Status: COMPLETED | OUTPATIENT
Start: 2021-04-19 | End: 2021-04-19

## 2021-04-19 RX ORDER — AMPICILLIN TRIHYDRATE 250 MG
1 CAPSULE ORAL EVERY 4 HOURS
Refills: 0 | Status: DISCONTINUED | OUTPATIENT
Start: 2021-04-19 | End: 2021-04-20

## 2021-04-19 RX ORDER — SODIUM CHLORIDE 9 MG/ML
1000 INJECTION, SOLUTION INTRAVENOUS
Refills: 0 | Status: DISCONTINUED | OUTPATIENT
Start: 2021-04-19 | End: 2021-04-20

## 2021-04-19 RX ORDER — CITRIC ACID/SODIUM CITRATE 300-500 MG
15 SOLUTION, ORAL ORAL EVERY 6 HOURS
Refills: 0 | Status: DISCONTINUED | OUTPATIENT
Start: 2021-04-19 | End: 2021-04-20

## 2021-04-19 RX ORDER — OXYTOCIN 10 UNIT/ML
333.33 VIAL (ML) INJECTION
Qty: 20 | Refills: 0 | Status: DISCONTINUED | OUTPATIENT
Start: 2021-04-19 | End: 2021-04-20

## 2021-04-19 RX ADMIN — Medication 216 GRAM(S): at 21:28

## 2021-04-19 RX ADMIN — SODIUM CHLORIDE 125 MILLILITER(S): 9 INJECTION, SOLUTION INTRAVENOUS at 21:28

## 2021-04-19 NOTE — PATIENT PROFILE OB - ANTEPARTUM INPATIENT
Thank you,  Devon Aqq  [address] Utilization Review Department  Phone: [phone]; Fax [phone]  ATTENTION: The Network Utilization Review Department is now centralized for our 9 Facilities  Make a note that we have a new phone and fax numbers for our Department  Please call with any questions or concerns to [phone] and carefully follow the prompts so that you are directed to the right person  All voicemails are confidential  Fax any determinations, approvals, denials, and requests for initial or continue stay review clinical to [phone]  Due to HIGH CALL volume, it would be easier if you could please send faxed requests to expedite your requests and in part, help us provide discharge notifications faster  Initial Clinical Review    Admission: Date/Time/Statement: 08/15/2018 2157    Orders Placed This Encounter   Procedures    Place in Observation     Standing Status:   Standing     Number of Occurrences:   1     Order Specific Question:   Admitting Physician     Answer:   Kateryna Gregorio     Order Specific Question:   Level of Care     Answer:   Med Surg [16]     08/16/18 1322  Inpatient Admission Once     Transfer Service: General Medicine       Question Answer Comment   Admitting Physician RANJEET YOUNG    Level of Care Med Surg    Estimated length of stay More than 2 Midnights    Certification I certify that inpatient services are medically necessary for this patient for a duration of greater than two midnights  See H&P and MD Progress Notes for additional information about the patient's course of treatment          08/16/18 1322     Patient changed from obs 8/15/2018  2157 to inpatient on 8/16/2018 1322  As it will be necessary for this patient to be treated greater than 2 mn      ED: Date/Time/Mode of Arrival:   ED Arrival Information     Expected Arrival Acuity Means of Arrival Escorted By Service Admission Type    - 8/15/2018 18:58 Urgent Walk-In Spouse General Medicine Urgent    Arrival Complaint    BODY NUMBNESS           Chief Complaint:   Chief Complaint   Patient presents with    Numbness     Patient states she was seen Monday for numbness and it was low potassium  Patient was given potassium and discharged  Patient states she sees a nephrologist and was told to increase her potassium  Dania's nephrologist also prescribed Spironolactone that she started taking yesterday  Today, patient states she has numbness in her hands, feet, and face  History of Illness: 31 y o  female who presents with CC of paresthesia in b/l le and bl u/e and around her mouth  She reports that she has been having an ongoing issue with loosing potassium and has had several visits to the ER for replacement  Chart indicates that had additional testing including  MRI and lumbar puncture - which did not reveal an etiology for her paresthesias  Diagnosis to date appears to be secondary to  multifactorial d/t  history of gastric bypass surgery and recent dietary changes  Chart also indicates that again she was seen in  ER  On 8/13/18 again with bilateral lower extremity paresthesias and found again to be hypokalemic with potassium 2 8    Potassium IV repletion was completed in the ED with and an increase in oral potassium intake at home and began spironolactone at the direction of her nephrologist of which she has taken 2 doses to date  ED Vital Signs:   ED Triage Vitals [08/15/18 1919]   Temperature Pulse Respirations Blood Pressure SpO2   97 7 °F (36 5 °C) 100 16 110/59 100 %      Temp Source Heart Rate Source Patient Position - Orthostatic VS BP Location FiO2 (%)   Temporal Monitor Sitting Right arm --      Pain Score       7        Wt Readings from Last 1 Encounters:   08/16/18 72 3 kg (159 lb 6 3 oz)       Vital Signs (abnormal): Abnormal Labs/Diagnostic Test Results: K+ 2 7--cl 110--ca 8 2  T   Bili 0 10--alb 3 2  Urine trace ketones  Venous bg-- p02 27 4--hc03 21 3--02 hgb 48 5    ED Treatment:   Medication Administration from 08/15/2018 1858 to 08/16/2018 1036       Date/Time Order Dose Route Action Action by Comments     08/15/2018 2037 metoclopramide (REGLAN) injection 10 mg 10 mg Intravenous Given Bethany Vincent RN      08/15/2018 2037 diphenhydrAMINE (BENADRYL) injection 25 mg 25 mg Intravenous Given Bethany Vincent RN      08/15/2018 2136 magnesium sulfate 2 g/50 mL IVPB (premix) 2 g 0 g Intravenous Stopped Bethany Vincent RN      08/15/2018 2036 magnesium sulfate 2 g/50 mL IVPB (premix) 2 g 2 g Intravenous New Bag Bob Mims RN      08/15/2018 2136 sodium chloride 0 9 % bolus 1,000 mL 0 mL Intravenous Stopped Bethany Vincent RN      08/15/2018 2036 sodium chloride 0 9 % bolus 1,000 mL 1,000 mL Intravenous New Bag Bethany Vincent RN      08/15/2018 2359 potassium chloride 20 mEq IVPB (premix) 0 mEq Intravenous Stopped Bethany Vincent RN      08/15/2018 2159 potassium chloride 20 mEq IVPB (premix) 20 mEq Intravenous New [address] Bethany Vincent RN      08/15/2018 2159 potassium chloride (K-DUR,KLOR-CON) CR tablet 40 mEq 40 mEq Oral Given Bethany Vincent RN      08/16/2018 0122 amitriptyline (ELAVIL) tablet 25 mg 25 mg Oral Given Bethany Vincent RN      08/16/2018 0122 topiramate (TOPAMAX) tablet 25 mg 25 mg Oral Given Bethany Vincent RN      08/16/2018 2204 potassium chloride 20 mEq IVPB (premix) 20 mEq Intravenous New Bag Mary Kraus RN      08/16/2018 0810 potassium-sodium phosphateS (K-PHOS,PHOSPHA 250) -250 mg tablet 1 tablet 1 tablet Oral Given Mary Kraus RN      08/16/2018 5967 potassium chloride (K-DUR,KLOR-CON) CR tablet 40 mEq 40 mEq Oral Given Mary Kraus RN           Past Medical/Surgical History:    Active Ambulatory Problems     Diagnosis Date Noted    Hypokalemia 07/07/2018    History of gastric bypass 07/07/2018    Migraine without aura and without status migrainosus, not intractable 07/07/2018    Left-sided weakness 07/07/2018    Hypocalcemia 07/09/2018    Anemia 07/10/2018    Vitamin D deficiency 07/10/2018    Weakness of both lower extremities 07/11/2018    Weakness of both upper extremities 07/11/2018    Elevated CPK 07/11/2018    Vitamin B12 deficiency 07/11/2018    Cervical lymphadenopathy 07/11/2018    Generalized weakness 07/12/2018    Paresthesia of both lower extremities 08/15/2018     Resolved Ambulatory Problems     Diagnosis Date Noted    Non-traumatic rhabdomyolysis 07/07/2018    Transaminitis 07/07/2018    Hypophosphatemia 07/09/2018    Hypernatremia 07/09/2018     Past Medical History:   Diagnosis Date    Hypokalemia        Admitting Diagnosis: Numbness and tingling [R20 0, R20 2]    Age/Sex: 31 y o  female    Assessment/Plan:  Paresthesia   Assessment & Plan     Ordered iodized calcium and Vit D panel   No loss of sensation - feels numb and tingly, good strength through out 5/5  MRI C spine reviewed from 7/11/18 - "Normal appearance of the cervical spinal cord   No cord compression or cord signal abnormality   No significant central or foraminal narrowing "          * Hypokalemia   Assessment & Plan     Recurrent episode of hypokalemia- K-2 7 today -   Pat complains of paresthesia in b/l le and b/l ue in addition to around her mouth   Potassium was repleted in the ER collect new bmp @ 0000 and tomorrow am 0600   Continual cardiac monitoring  VS per protocol  Nephrology consulted                   Hypocalcemia   Assessment & Plan     Ordered ionized calcium and vit D panel  Monitor bmp  Continual cardiac monitoring           History of gastric bypass   Assessment & Plan     Monitor electrolytes   Continue PTA Vitamin              PAdmission Orders:  Scheduled Meds:   Current Facility-Administered Medications:  amitriptyline 25 mg Oral HS ARIADNA Short    butalbital-acetaminophen-caffeine 1 tablet Oral Q4H PRN ARIADNA Gilbert    calcium carbonate-vitamin D 1 tablet Oral BID With Meals ARIADNA Monten[place]ro    cholecalciferol 1,000 Units Oral Daily Anusha Y Swank, ARIADNA    cyanocobalamin 500 mcg Oral Daily Anusha Y Swank, ARIADNA    meclizine 25 mg Oral Q12H PRN ARIADNA [place]    multivitamin-minerals 1 tablet Oral Daily Anusha Y Swank, ARIADNA    potassium chloride 40 mEq Oral TID Natasha Noguera DO    potassium chloride 20 mEq Intravenous Once Saundra Y SwankARIADNA Last Rate: 20 mEq (08/16/18 0707)   potassium-sodium phosphateS 1 tablet Oral 4x Daily (with meals and at bedtime) ARIADNA [place]    topiramate 25 mg Oral Q12H [address] Saundra Y Rosarioank, ARIADNA      Continuous Infusions:    PRN Meds: butalbital-acetaminophen-caffeine    meclizine     telm   daily wt  Regular diet   consult nephrology  Pt/ot    K+ 2 9--cl 112---ca 7 8--phos 23--hgb 10 1/30 7 No

## 2021-04-20 LAB
COVID-19 SPIKE DOMAIN AB INTERP: POSITIVE
COVID-19 SPIKE DOMAIN ANTIBODY RESULT: 38.6 U/ML — HIGH
SARS-COV-2 IGG+IGM SERPL QL IA: 38.6 U/ML — HIGH
SARS-COV-2 IGG+IGM SERPL QL IA: POSITIVE
SARS-COV-2 RNA SPEC QL NAA+PROBE: SIGNIFICANT CHANGE UP
T PALLIDUM AB TITR SER: NEGATIVE — SIGNIFICANT CHANGE UP

## 2021-04-20 PROCEDURE — 59400 OBSTETRICAL CARE: CPT

## 2021-04-20 RX ORDER — OXYCODONE HYDROCHLORIDE 5 MG/1
5 TABLET ORAL ONCE
Refills: 0 | Status: DISCONTINUED | OUTPATIENT
Start: 2021-04-20 | End: 2021-04-21

## 2021-04-20 RX ORDER — IBUPROFEN 200 MG
600 TABLET ORAL EVERY 6 HOURS
Refills: 0 | Status: DISCONTINUED | OUTPATIENT
Start: 2021-04-20 | End: 2021-04-21

## 2021-04-20 RX ORDER — AER TRAVELER 0.5 G/1
1 SOLUTION RECTAL; TOPICAL EVERY 4 HOURS
Refills: 0 | Status: DISCONTINUED | OUTPATIENT
Start: 2021-04-20 | End: 2021-04-21

## 2021-04-20 RX ORDER — LANOLIN
1 OINTMENT (GRAM) TOPICAL EVERY 6 HOURS
Refills: 0 | Status: DISCONTINUED | OUTPATIENT
Start: 2021-04-20 | End: 2021-04-21

## 2021-04-20 RX ORDER — KETOROLAC TROMETHAMINE 30 MG/ML
30 SYRINGE (ML) INJECTION ONCE
Refills: 0 | Status: DISCONTINUED | OUTPATIENT
Start: 2021-04-20 | End: 2021-04-20

## 2021-04-20 RX ORDER — SODIUM CHLORIDE 9 MG/ML
3 INJECTION INTRAMUSCULAR; INTRAVENOUS; SUBCUTANEOUS EVERY 8 HOURS
Refills: 0 | Status: DISCONTINUED | OUTPATIENT
Start: 2021-04-20 | End: 2021-04-21

## 2021-04-20 RX ORDER — DIBUCAINE 1 %
1 OINTMENT (GRAM) RECTAL EVERY 6 HOURS
Refills: 0 | Status: DISCONTINUED | OUTPATIENT
Start: 2021-04-20 | End: 2021-04-21

## 2021-04-20 RX ORDER — TETANUS TOXOID, REDUCED DIPHTHERIA TOXOID AND ACELLULAR PERTUSSIS VACCINE, ADSORBED 5; 2.5; 8; 8; 2.5 [IU]/.5ML; [IU]/.5ML; UG/.5ML; UG/.5ML; UG/.5ML
0.5 SUSPENSION INTRAMUSCULAR ONCE
Refills: 0 | Status: DISCONTINUED | OUTPATIENT
Start: 2021-04-20 | End: 2021-04-21

## 2021-04-20 RX ORDER — MAGNESIUM HYDROXIDE 400 MG/1
30 TABLET, CHEWABLE ORAL
Refills: 0 | Status: DISCONTINUED | OUTPATIENT
Start: 2021-04-20 | End: 2021-04-21

## 2021-04-20 RX ORDER — HYDROCORTISONE 1 %
1 OINTMENT (GRAM) TOPICAL EVERY 6 HOURS
Refills: 0 | Status: DISCONTINUED | OUTPATIENT
Start: 2021-04-20 | End: 2021-04-21

## 2021-04-20 RX ORDER — ACETAMINOPHEN 500 MG
975 TABLET ORAL
Refills: 0 | Status: DISCONTINUED | OUTPATIENT
Start: 2021-04-20 | End: 2021-04-21

## 2021-04-20 RX ORDER — IBUPROFEN 200 MG
600 TABLET ORAL EVERY 6 HOURS
Refills: 0 | Status: COMPLETED | OUTPATIENT
Start: 2021-04-20 | End: 2022-03-19

## 2021-04-20 RX ORDER — OXYTOCIN 10 UNIT/ML
333.33 VIAL (ML) INJECTION
Qty: 20 | Refills: 0 | Status: DISCONTINUED | OUTPATIENT
Start: 2021-04-20 | End: 2021-04-21

## 2021-04-20 RX ORDER — OXYCODONE HYDROCHLORIDE 5 MG/1
5 TABLET ORAL
Refills: 0 | Status: DISCONTINUED | OUTPATIENT
Start: 2021-04-20 | End: 2021-04-21

## 2021-04-20 RX ORDER — DIPHENHYDRAMINE HCL 50 MG
25 CAPSULE ORAL EVERY 6 HOURS
Refills: 0 | Status: DISCONTINUED | OUTPATIENT
Start: 2021-04-20 | End: 2021-04-21

## 2021-04-20 RX ORDER — PRAMOXINE HYDROCHLORIDE 150 MG/15G
1 AEROSOL, FOAM RECTAL EVERY 4 HOURS
Refills: 0 | Status: DISCONTINUED | OUTPATIENT
Start: 2021-04-20 | End: 2021-04-21

## 2021-04-20 RX ORDER — SIMETHICONE 80 MG/1
80 TABLET, CHEWABLE ORAL EVERY 4 HOURS
Refills: 0 | Status: DISCONTINUED | OUTPATIENT
Start: 2021-04-20 | End: 2021-04-21

## 2021-04-20 RX ORDER — BENZOCAINE 10 %
1 GEL (GRAM) MUCOUS MEMBRANE EVERY 6 HOURS
Refills: 0 | Status: DISCONTINUED | OUTPATIENT
Start: 2021-04-20 | End: 2021-04-21

## 2021-04-20 RX ADMIN — Medication 600 MILLIGRAM(S): at 12:45

## 2021-04-20 RX ADMIN — Medication 1000 MILLIUNIT(S)/MIN: at 01:08

## 2021-04-20 RX ADMIN — SODIUM CHLORIDE 3 MILLILITER(S): 9 INJECTION INTRAMUSCULAR; INTRAVENOUS; SUBCUTANEOUS at 14:05

## 2021-04-20 RX ADMIN — Medication 975 MILLIGRAM(S): at 15:35

## 2021-04-20 RX ADMIN — SODIUM CHLORIDE 3 MILLILITER(S): 9 INJECTION INTRAMUSCULAR; INTRAVENOUS; SUBCUTANEOUS at 06:58

## 2021-04-20 RX ADMIN — Medication 975 MILLIGRAM(S): at 10:38

## 2021-04-20 RX ADMIN — Medication 600 MILLIGRAM(S): at 11:55

## 2021-04-20 RX ADMIN — Medication 600 MILLIGRAM(S): at 17:39

## 2021-04-20 RX ADMIN — Medication 30 MILLIGRAM(S): at 02:21

## 2021-04-20 RX ADMIN — Medication 975 MILLIGRAM(S): at 15:12

## 2021-04-20 RX ADMIN — Medication 975 MILLIGRAM(S): at 10:15

## 2021-04-20 RX ADMIN — Medication 975 MILLIGRAM(S): at 21:36

## 2021-04-20 RX ADMIN — Medication 600 MILLIGRAM(S): at 17:45

## 2021-04-20 NOTE — LACTATION INITIAL EVALUATION - POTENTIAL FOR
ineffective breastfeeding/sore nipples/knowledge deficit/feeding confusion/latch on difficulty/low supply

## 2021-04-20 NOTE — LACTATION INITIAL EVALUATION - REQUESTED BY
40.5 wk gestation baby, about 18hrs old at this time. Placed the baby with the mother while I provided breastfeeding education and explained normal  behaviour and the milk production feedback system. Assisted with positioning in a cross cradle hold and taught latch strategies. Baby was able to latch deeply and is feeding well, rhythmically sucking between short pauses of rest. Mother to continue with STS when possible, room-in, and feed as per cues at least 8-12x/ day. To f/u as needed./staff/routine offer of consultation

## 2021-04-21 ENCOUNTER — APPOINTMENT (OUTPATIENT)
Dept: OBGYN | Facility: CLINIC | Age: 26
End: 2021-04-21

## 2021-04-21 ENCOUNTER — TRANSCRIPTION ENCOUNTER (OUTPATIENT)
Age: 26
End: 2021-04-21

## 2021-04-21 VITALS
DIASTOLIC BLOOD PRESSURE: 70 MMHG | SYSTOLIC BLOOD PRESSURE: 103 MMHG | RESPIRATION RATE: 17 BRPM | OXYGEN SATURATION: 98 % | HEART RATE: 69 BPM | TEMPERATURE: 98 F

## 2021-04-21 LAB
HCT VFR BLD CALC: 32.8 % — LOW (ref 34.5–45)
HGB BLD-MCNC: 11 G/DL — LOW (ref 11.5–15.5)

## 2021-04-21 PROCEDURE — 85014 HEMATOCRIT: CPT

## 2021-04-21 PROCEDURE — 86780 TREPONEMA PALLIDUM: CPT

## 2021-04-21 PROCEDURE — 85018 HEMOGLOBIN: CPT

## 2021-04-21 PROCEDURE — 59050 FETAL MONITOR W/REPORT: CPT

## 2021-04-21 PROCEDURE — 86901 BLOOD TYPING SEROLOGIC RH(D): CPT

## 2021-04-21 PROCEDURE — 99214 OFFICE O/P EST MOD 30 MIN: CPT

## 2021-04-21 PROCEDURE — U0005: CPT

## 2021-04-21 PROCEDURE — 85025 COMPLETE CBC W/AUTO DIFF WBC: CPT

## 2021-04-21 PROCEDURE — 86900 BLOOD TYPING SEROLOGIC ABO: CPT

## 2021-04-21 PROCEDURE — 86769 SARS-COV-2 COVID-19 ANTIBODY: CPT

## 2021-04-21 PROCEDURE — 36415 COLL VENOUS BLD VENIPUNCTURE: CPT

## 2021-04-21 PROCEDURE — 86850 RBC ANTIBODY SCREEN: CPT

## 2021-04-21 PROCEDURE — U0003: CPT

## 2021-04-21 RX ORDER — IBUPROFEN 200 MG
1 TABLET ORAL
Qty: 0 | Refills: 0 | DISCHARGE
Start: 2021-04-21

## 2021-04-21 RX ORDER — ACETAMINOPHEN 500 MG
3 TABLET ORAL
Qty: 0 | Refills: 0 | DISCHARGE
Start: 2021-04-21

## 2021-04-21 RX ORDER — BENZOCAINE 10 %
1 GEL (GRAM) MUCOUS MEMBRANE
Qty: 0 | Refills: 0 | DISCHARGE
Start: 2021-04-21

## 2021-04-21 RX ADMIN — Medication 975 MILLIGRAM(S): at 03:00

## 2021-04-21 RX ADMIN — Medication 600 MILLIGRAM(S): at 01:13

## 2021-04-21 RX ADMIN — Medication 600 MILLIGRAM(S): at 00:12

## 2021-04-21 RX ADMIN — Medication 975 MILLIGRAM(S): at 09:32

## 2021-04-21 RX ADMIN — Medication 600 MILLIGRAM(S): at 18:16

## 2021-04-21 RX ADMIN — Medication 600 MILLIGRAM(S): at 12:25

## 2021-04-21 RX ADMIN — Medication 600 MILLIGRAM(S): at 19:00

## 2021-04-21 RX ADMIN — Medication 1 TABLET(S): at 12:25

## 2021-04-21 RX ADMIN — Medication 975 MILLIGRAM(S): at 21:04

## 2021-04-21 RX ADMIN — Medication 975 MILLIGRAM(S): at 14:59

## 2021-04-21 RX ADMIN — Medication 975 MILLIGRAM(S): at 10:25

## 2021-04-21 RX ADMIN — Medication 600 MILLIGRAM(S): at 13:25

## 2021-04-21 RX ADMIN — Medication 975 MILLIGRAM(S): at 16:00

## 2021-04-21 RX ADMIN — Medication 600 MILLIGRAM(S): at 06:27

## 2021-04-21 RX ADMIN — Medication 600 MILLIGRAM(S): at 06:52

## 2021-04-21 NOTE — PROGRESS NOTE ADULT - ASSESSMENT
Assessment/Plan    25y y.o. F now PPD#1 s/p . AfVSS. Patient is progressing toward all postpartum milestones. Pain is well-controlled on PO medications. Anticipate discharge today or tomorrow.    1. Pain  - Well-controlled on Motrin and Tylenol ATC    2. GI  - Tolerating regular diet without n/v  - Senna PRN    3.   - Voiding spontaneously without difficulty    4. DVT prophylaxis  - Encouraging ambulation    5. Dispo  - Anticipate dispo PPD#1        Ina Correa MD PGY1

## 2021-04-21 NOTE — DISCHARGE NOTE OB - PATIENT PORTAL LINK FT
You can access the FollowMyHealth Patient Portal offered by [facility] by registering at the following website: [email or web address]. By joining [employer]’s FollowMyHealth portal, you will also be able to view your health information using other applications (apps) compatible with our system.

## 2021-04-21 NOTE — DISCHARGE NOTE OB - CARE PROVIDER_API CALL
Stuart Hunt)  Obstetrics and Gynecology  [address], [facility], [place] [address]  Phone: [phone]  Fax: [phone]  Follow Up Time:

## 2021-04-21 NOTE — PROGRESS NOTE ADULT - SUBJECTIVE AND OBJECTIVE BOX
Patient is a 25y y.o. F now PPD #1 s/p .    NAEO. Patient was evaluated at bedside this AM. Pain is well-controlled with PO pain medications. She has been ambulating without difficulty and voiding spontaneously. She endorses decreasing vaginal bleeding.    Vital Signs Last 24 Hrs  T(C): 36.8 (2021 05:52), Max: 36.9 (2021 22:28)  T(F): 98.2 (2021 05:52), Max: 98.5 (2021 22:28)  HR: 77 (2021 05:52) (71 - 86)  BP: 102/68 (2021 05:52) (96/61 - 105/66)  BP(mean): --  RR: 18 (2021 05:52) (17 - 18)  SpO2: 97% (2021 05:52) (97% - 98%)    Physical Exam  Gen: Well-appearing. No acute distress. Resting comfortably in bed.  Resp: Breathing comfortably on RA.  Abd: Soft, non-tender, non-distended. Uterus firm at umbilicus.  Extremities: No calf tenderness.    Labs                          11.9   9.39  )-----------( 259      ( 2021 21:02 )             35.7                   21 @ 07:01  -  21 @ 07:00  --------------------------------------------------------  IN: 1000 mL / OUT: 300 mL / NET: 700 mL

## 2021-04-26 DIAGNOSIS — Z34.83 ENCOUNTER FOR SUPERVISION OF OTHER NORMAL PREGNANCY, THIRD TRIMESTER: ICD-10-CM

## 2021-04-26 DIAGNOSIS — Z3A.40 40 WEEKS GESTATION OF PREGNANCY: ICD-10-CM

## 2021-04-26 DIAGNOSIS — O48.0 POST-TERM PREGNANCY: ICD-10-CM

## 2021-05-20 DIAGNOSIS — Z34.90 ENCOUNTER FOR SUPERVISION OF NORMAL PREGNANCY, UNSPECIFIED, UNSPECIFIED TRIMESTER: ICD-10-CM

## 2021-05-28 ENCOUNTER — NON-APPOINTMENT (OUTPATIENT)
Age: 26
End: 2021-05-28

## 2021-05-28 ENCOUNTER — APPOINTMENT (OUTPATIENT)
Dept: OBGYN | Facility: CLINIC | Age: 26
End: 2021-05-28
Payer: COMMERCIAL

## 2021-05-28 VITALS
HEIGHT: 63 IN | BODY MASS INDEX: 26.58 KG/M2 | WEIGHT: 150 LBS | SYSTOLIC BLOOD PRESSURE: 104 MMHG | DIASTOLIC BLOOD PRESSURE: 66 MMHG

## 2021-05-28 PROCEDURE — 0503F POSTPARTUM CARE VISIT: CPT

## 2021-05-28 NOTE — HISTORY OF PRESENT ILLNESS
[Postpartum Follow Up] : postpartum follow up [Delivery Date: ___] : on [unfilled] [] : delivered by vaginal delivery [Female] : Delivery History: baby girl [Wt. ___] : weighing [unfilled] [Breastfeeding] : not currently nursing [Back to Normal] : is back to normal in size [Normal] : the vagina was normal [Healing Well] : is healing well [Cervix Sample Taken] : cervical sample not taken for a Pap smear [Not Done] : Examination of breasts not done [Doing Well] : is doing well [No Sign of Infection] : is showing no signs of infection [Excellent Pain Control] : has excellent pain control [None] : None

## 2021-07-10 NOTE — DISCHARGE NOTE OB - YES
Statement Selected
Alert and oriented, no focal deficits, cranial nerves intact, strength and sensation intact x4, smooth gait.

## 2022-01-17 ENCOUNTER — NON-APPOINTMENT (OUTPATIENT)
Age: 27
End: 2022-01-17

## 2022-01-28 ENCOUNTER — RX RENEWAL (OUTPATIENT)
Age: 27
End: 2022-01-28

## 2022-01-28 RX ORDER — NORGESTREL AND ETHINYL ESTRADIOL 0.3-0.03MG
0.3-3 KIT ORAL DAILY
Qty: 84 | Refills: 0 | Status: ACTIVE | COMMUNITY
Start: 2021-05-20 | End: 1900-01-01

## 2022-03-16 RX ORDER — NORGESTREL AND ETHINYL ESTRADIOL 0.3-0.03MG
0.3-3 KIT ORAL DAILY
Qty: 84 | Refills: 0 | Status: ACTIVE | COMMUNITY
Start: 2017-03-23 | End: 1900-01-01

## 2022-04-15 NOTE — PATIENT PROFILE OB - WEIGHT : PRESENT IN LBS
8 month old M with no PMHx presents to the ED for fever x 1 day. Mother reports she has been giving Tylenol but notes pt vomits it up. No diarrhea. Pt has had about 8 wet diapers today.
168

## 2023-08-22 NOTE — PATIENT PROFILE OB - NS PRO ABUSE SCREEN AFRAID ANYONE YN
Dr. aMrques Ragland,   Patient would like to know if he has ATTR-CM? This RN sent in Harolyn Juan and Metoprolol scripts today to [facility]. However, the patient himself had me send to [facility] at his office visit last week. no

## 2024-09-12 NOTE — DISCHARGE NOTE OB - PRO FEEDING PREV EXP YN OB
Quality 130: Documentation Of Current Medications In The Medical Record: Current Medications Documented Detail Level: Detailed Quality 226: Preventive Care And Screening: Tobacco Use: Screening And Cessation Intervention: Patient screened for tobacco use and is an ex/non-smoker Quality 134: Screening For Clinical Depression And Follow-Up Plan: The patient was screened for depression and the screen was negative and no follow up required no

## 2024-10-08 ENCOUNTER — APPOINTMENT (OUTPATIENT)
Dept: OBGYN | Facility: CLINIC | Age: 29
End: 2024-10-08
Payer: COMMERCIAL

## 2024-10-08 VITALS
OXYGEN SATURATION: 99 % | DIASTOLIC BLOOD PRESSURE: 69 MMHG | WEIGHT: 134 LBS | SYSTOLIC BLOOD PRESSURE: 105 MMHG | HEART RATE: 74 BPM

## 2024-10-08 DIAGNOSIS — Z01.419 ENCOUNTER FOR GYNECOLOGICAL EXAMINATION (GENERAL) (ROUTINE) W/OUT ABNORMAL FINDINGS: ICD-10-CM

## 2024-10-08 PROCEDURE — 99385 PREV VISIT NEW AGE 18-39: CPT

## 2024-10-14 LAB — CYTOLOGY CVX/VAG DOC THIN PREP: NORMAL
